# Patient Record
Sex: FEMALE | Race: WHITE | NOT HISPANIC OR LATINO
[De-identification: names, ages, dates, MRNs, and addresses within clinical notes are randomized per-mention and may not be internally consistent; named-entity substitution may affect disease eponyms.]

---

## 2017-05-31 ENCOUNTER — RECORDS - HEALTHEAST (OUTPATIENT)
Dept: ADMINISTRATIVE | Facility: OTHER | Age: 62
End: 2017-05-31

## 2017-05-31 ENCOUNTER — COMMUNICATION - HEALTHEAST (OUTPATIENT)
Dept: FAMILY MEDICINE | Facility: CLINIC | Age: 62
End: 2017-05-31

## 2017-08-22 ENCOUNTER — OFFICE VISIT - HEALTHEAST (OUTPATIENT)
Dept: FAMILY MEDICINE | Facility: CLINIC | Age: 62
End: 2017-08-22

## 2017-08-22 DIAGNOSIS — J30.9 ALLERGIC RHINITIS: ICD-10-CM

## 2017-08-22 DIAGNOSIS — K63.5 COLON POLYPS: ICD-10-CM

## 2018-10-23 ENCOUNTER — OFFICE VISIT - HEALTHEAST (OUTPATIENT)
Dept: FAMILY MEDICINE | Facility: CLINIC | Age: 63
End: 2018-10-23

## 2018-10-23 DIAGNOSIS — J30.1 SEASONAL ALLERGIC RHINITIS DUE TO POLLEN: ICD-10-CM

## 2018-10-23 DIAGNOSIS — Z12.31 VISIT FOR SCREENING MAMMOGRAM: ICD-10-CM

## 2018-11-09 ENCOUNTER — HOSPITAL ENCOUNTER (OUTPATIENT)
Dept: MAMMOGRAPHY | Facility: CLINIC | Age: 63
Discharge: HOME | End: 2018-11-09
Attending: NURSE PRACTITIONER

## 2018-11-09 DIAGNOSIS — Z12.31 VISIT FOR SCREENING MAMMOGRAM: ICD-10-CM

## 2019-11-05 ENCOUNTER — OFFICE VISIT - HEALTHEAST (OUTPATIENT)
Dept: FAMILY MEDICINE | Facility: CLINIC | Age: 64
End: 2019-11-05

## 2019-11-05 DIAGNOSIS — J30.1 SEASONAL ALLERGIC RHINITIS DUE TO POLLEN: ICD-10-CM

## 2019-11-05 DIAGNOSIS — Z13.9 SCREENING FOR CONDITION: ICD-10-CM

## 2019-11-05 DIAGNOSIS — R03.0 ELEVATED BLOOD PRESSURE READING WITHOUT DIAGNOSIS OF HYPERTENSION: ICD-10-CM

## 2019-11-05 DIAGNOSIS — M54.2 NECK PAIN: ICD-10-CM

## 2019-11-05 LAB
CHOLEST SERPL-MCNC: 261 MG/DL
FASTING STATUS PATIENT QL REPORTED: YES
FASTING STATUS PATIENT QL REPORTED: YES
GLUCOSE BLD-MCNC: 79 MG/DL (ref 70–99)
HDLC SERPL-MCNC: 78 MG/DL
LDLC SERPL CALC-MCNC: 172 MG/DL
TRIGL SERPL-MCNC: 54 MG/DL

## 2019-11-05 RX ORDER — DESLORATADINE 5 MG/1
5 TABLET ORAL DAILY
Qty: 90 TABLET | Refills: 3 | Status: SHIPPED | OUTPATIENT
Start: 2019-11-05

## 2019-11-05 ASSESSMENT — MIFFLIN-ST. JEOR: SCORE: 1255.51

## 2019-11-06 ENCOUNTER — COMMUNICATION - HEALTHEAST (OUTPATIENT)
Dept: FAMILY MEDICINE | Facility: CLINIC | Age: 64
End: 2019-11-06

## 2021-05-24 ENCOUNTER — RECORDS - HEALTHEAST (OUTPATIENT)
Dept: ADMINISTRATIVE | Facility: CLINIC | Age: 66
End: 2021-05-24

## 2021-05-25 ENCOUNTER — RECORDS - HEALTHEAST (OUTPATIENT)
Dept: ADMINISTRATIVE | Facility: CLINIC | Age: 66
End: 2021-05-25

## 2021-05-26 ENCOUNTER — RECORDS - HEALTHEAST (OUTPATIENT)
Dept: ADMINISTRATIVE | Facility: CLINIC | Age: 66
End: 2021-05-26

## 2021-05-27 ENCOUNTER — RECORDS - HEALTHEAST (OUTPATIENT)
Dept: ADMINISTRATIVE | Facility: CLINIC | Age: 66
End: 2021-05-27

## 2021-05-28 ENCOUNTER — RECORDS - HEALTHEAST (OUTPATIENT)
Dept: ADMINISTRATIVE | Facility: CLINIC | Age: 66
End: 2021-05-28

## 2021-05-31 VITALS — WEIGHT: 161.4 LBS | BODY MASS INDEX: 27.28 KG/M2

## 2021-06-01 ENCOUNTER — RECORDS - HEALTHEAST (OUTPATIENT)
Dept: ADMINISTRATIVE | Facility: CLINIC | Age: 66
End: 2021-06-01

## 2021-06-02 VITALS — WEIGHT: 158.25 LBS | BODY MASS INDEX: 26.74 KG/M2

## 2021-06-03 VITALS
BODY MASS INDEX: 26.73 KG/M2 | TEMPERATURE: 97 F | SYSTOLIC BLOOD PRESSURE: 152 MMHG | DIASTOLIC BLOOD PRESSURE: 77 MMHG | WEIGHT: 160.4 LBS | OXYGEN SATURATION: 100 % | HEIGHT: 65 IN | HEART RATE: 66 BPM

## 2021-06-03 NOTE — PATIENT INSTRUCTIONS - HE
Get a home blood pressure cuff and check a few times a week and let me know if >140/90 (in this case we should have you come in to start medication).    Please think about colonoscopy - you are overdue and at increased risk of colon cancer with your history of polyp.    Think about pneumonia shots and shingles vaccines - they are recommended. You can get them with a nurse only visit or at the drug store.

## 2021-06-03 NOTE — PROGRESS NOTES
ASSESSMENT & PLAN:  1.  allergic rhinitis   - desloratadine (CLARINEX) 5 mg tablet; Take 1 tablet (5 mg total) by mouth daily.  Dispense: 90 tablet; Refill: 3    2. Screening for condition  She is fasting today and willing to obtain screening labs, which have not been done for quite some time  - Lipid Cascade  - Glucose    3.  Elevated blood pressure without diagnosis of hypertension  Recommend she check her home blood pressure at least a few times a week and let me know if running greater than 140/90.  We discussed that medications would be recommended even if trying to work on lifestyle changes.    4.  History of adenomatous polyp of the colon  Strongly recommended colonoscopy.  Spent some time discussing this, and her risk of delayed diagnosis of colon cancer if she is not getting appropriately screened.  She could discuss with GI her significant difficulty with prep in the past.  After much discussion she still declines an order but states she will think about it.  Not a candidate for Cologuard due to previous polyp.    5.  Need for vaccination  Encouraged Prevnar, Pneumovax, and recommend she consider shingles vaccine.  Discussed safety and potential side effects.  She was given vaccine information sheets on these.    6.  Intermittent right neck pain  Normal exam today.  Suspect muscular versus an intermittently irritated lymph node over the last month.  Recommend she follow-up if this persists or certainly if she notices any lump or mass.    Patient Instructions   Get a home blood pressure cuff and check a few times a week and let me know if >140/90 (in this case we should have you come in to start medication).    Please think about colonoscopy - you are overdue and at increased risk of colon cancer with your history of polyp.    Think about pneumonia shots and shingles vaccines - they are recommended. You can get them with a nurse only visit or at the drug store.           Orders Placed This Encounter    Procedures     Lipid Cascade     Order Specific Question:   Fasting is required?     Answer:   Yes     Glucose     Order Specific Question:   Has the patient been fasting at least 8 hours?     Answer:   Yes     Medications Discontinued During This Encounter   Medication Reason     desloratadine (CLARINEX) 5 mg tablet Reorder       Return in about 1 year (around 11/5/2020) for Annual physical.    CHIEF COMPLAINT:  Chief Complaint   Patient presents with     Allergies     refills of clarinex     Pain     pain in right side of neck/jaw, feels like mayne allergy drainage, slight sore throat today       HISTORY OF PRESENT ILLNESS:  Dolly is a 64 y.o. female presenting to the clinic today for med check and refill of her Clarinex.  States she has tried multiple allergy medications in the past and gets most relief with Clarinex.  Has also tried nasal spray such as Flonase, but she feels this gave her frequent sinus infections.  Reports year-round allergies.    Over the last month, reports a discomfort on her neck behind the angle of her jaw off and on, mild, also corresponding possibly with an earache on the right.  Has not noticed any significant lump or swelling in the area.  No fevers or chills.    Blood pressure is elevated today.  She states blood pressure has been mildly elevated at other recent visits.  She states her  has a home blood pressure cuff she could use.  She would strongly prefer lifestyle changes to try to control this.    Patient has a history of colon polyps, last had colonoscopy in 2011.  The prep was unbearable for her with multiple episodes of vomiting.  She has been unwilling to repeat the test since.    Patient follows with Dr. Sparrow for Pap, annual GYN exam, mammogram, and DEXA scan.  She states these are up-to-date.    Patient has not had pneumonia vaccines.  She is extremely hesitant to any vaccines as she feels that she gets a 7 to 10-day reaction, feeling extremely rundown, every  "time she gets a vaccine.    REVIEW OF SYSTEMS:   All other systems are negative.    PFSH:  Patient Active Problem List   Diagnosis     Allergic Rhinitis     Lyme disease 2013 treated      Colon polyps 2011- repeat every 5 years         TOBACCO USE:  Social History     Tobacco Use   Smoking Status Never Smoker   Smokeless Tobacco Never Used       VITALS:  Vitals:    11/05/19 1412 11/05/19 1447   BP: 150/78 152/77   Patient Site: Left Arm    Patient Position: Sitting    Cuff Size: Adult Large    Pulse: 66    Temp: 97  F (36.1  C)    TempSrc: Oral    SpO2: 100%    Weight: 160 lb 6.4 oz (72.8 kg)    Height: 5' 4.5\" (1.638 m)      Wt Readings from Last 3 Encounters:   11/05/19 160 lb 6.4 oz (72.8 kg)   10/23/18 158 lb 4 oz (71.8 kg)   08/22/17 161 lb 6.4 oz (73.2 kg)     Body mass index is 27.11 kg/m .    PHYSICAL EXAM:  GENERAL: Pleasant, well-appearing patient in no acute distress.   HEENT: Pupils equal round reactive to light. Sclerae and conjunctivae clear. Oropharynx is clear with moist mucous membranes.  TMs clear bilaterally.  NECK: Supple without lymphadenopathy, no carotid bruits   CARDIOVASCULAR: Heart regular rate and rhythm without murmur normal S1-S2   LUNGS: Clear to auscultation bilaterally, good air movement throughout   EXTREMITIES Warm and well-perfused without edema. Pedal pulses palpable and symmetric bilaterally   NEURO: Alert and oriented. Grossly nonfocal.   PSYCHIATRIC: Presents on time and well groomed. Normal speech and thought content. Full affect. No abnormal movements or behaviors noted.        MEDICATIONS:  Current Outpatient Medications   Medication Sig Dispense Refill     aspirin 81 MG EC tablet Take 81 mg by mouth daily.       desloratadine (CLARINEX) 5 mg tablet Take 1 tablet (5 mg total) by mouth daily. 90 tablet 3     No current facility-administered medications for this visit.          "

## 2021-06-12 NOTE — PROGRESS NOTES
ASSESSMENT & PLAN:  Dolly was seen today for medication management.    Diagnoses and all orders for this visit:    Allergic rhinitis    Colon polyps 2011- repeat every 5 years     Other orders  -     desloratadine (CLARINEX) 5 mg tablet; Take 1 tablet (5 mg total) by mouth daily.      -pts allergies well-controlled currently on the desloratadine.  She is wanting a refill today.  Year supply called in.  Also addressed her health maintenance.  Recommended that she schedule mammogram and bone density scan when she is not anxious to do but would consider scheduling her mammogram in the future and I did give her a referral phone number to do so.  Her colonoscopy in 2011 showed polyps and recommended repeating every 5 years but she is reluctant to following up at that interval since she had a hard time with the prep and is not interested in a referral at this time.  We discussed that she cannot always have symptoms to know if there is any underlying concern.  She will consider in the future and we discussed that I would continue to recommend this every year.  -She has had treatment for Lyme's prophylaxis this year and is not having any symptoms.  -[facility] on her last Pap smear.  Follow-up annually  -We reviewed labs and she has not had lab work done since 2012.  She is declining labs today and will consider next time I see her.    Patient Instructions   Schedule your mammogram at [facility] by calling [phone].       No orders of the defined types were placed in this encounter.    Medications Discontinued During This Encounter   Medication Reason     desloratadine (CLARINEX) 5 mg tablet Reorder       No Follow-up on file.     CHIEF COMPLAINT:  Chief Complaint   Patient presents with     Medication Management     Med Refills        HISTORY OF PRESENT ILLNESS:  Dolly is a 62 y.o. female presenting to the clinic today to follow up on her allergies, weight, tick bite, and health  maintenance.     Allergies: She has allergies in the spring and URI's in the winter. Desloratadine 5 mg daily is still working well for her. She started having allergies when she was living in [place], she had spring allergies in [place], allergies all year round in [place], and now she has allergies all year round in [place]. She has tried going off the medication, but her doctors told her to stay on it because of her symptoms. Her allergies have gotten worse with age; she is allergic to mold and dust all year round. She develops a gravely throat. Windy days are rough for her. 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     Overweight: She is wondering why her diastolic blood pressure reading is high at 88 today. She gained some weight this winter because of illness and inactivity. She drank some caffeine this morning. She has been drinking more caffeine this year than usual; she is sensitive to caffeine in that she cannot drink it after noon or it will affect her sleep.     Hx Lyme's Disease: She had a tick attached to her in June 2017; she was not sure how long the tick was attached for, so she went to the [facility] and took a course of doxycycline. It was probably attached for 12-14 hours, and it was a large tick.     Health Maintenance: She has not had a repeat colonoscopy yet. She cannot do Cologuard because she has hemorrhoids and a history of polyps. She is declining a colonoscopy for now; she would like to wait a while longer. She had diarrhea and vomiting from the prep; she could not even finish the prep, but everything else went well. She was seen by Dr. Sparrow for a Pap smear, but she thinks that was done in 2016. She is wondering if she should have a Life [facility] screening; many of her friends are doing it, and it might cost around $140. She is taking aspirin 81 mg daily. She is willing to have blood work done next year.     REVIEW OF SYSTEMS:   Complete review of  systems reviewed in the HPI or otherwise negative.     PFSH:    TOBACCO USE:  History   Smoking Status     Never Smoker   Smokeless Tobacco     Never Used        VITALS:  Vitals:    08/22/17 1127 08/22/17 1153   BP: 110/88 110/80   Patient Site: Right Arm Right Arm   Patient Position: Sitting Sitting   Cuff Size: Adult Regular Adult Regular   Pulse: 68    Resp: 16    Temp: 98.1  F (36.7  C)    TempSrc: Oral    Weight: 161 lb 6.4 oz (73.2 kg)      Wt Readings from Last 3 Encounters:   08/22/17 161 lb 6.4 oz (73.2 kg)   09/16/16 159 lb (72.1 kg)   09/24/15 150 lb 12 oz (68.4 kg)     Body mass index is 27.28 kg/(m^2).  No LMP recorded. Patient is postmenopausal.     PHYSICAL EXAM:  GENERAL:  Reveals an alert 62 y.o. female in NAD.  Vitals:  Per nursing notes.  EYES: PERRLA. Extraocular movements intact. Normal conjunctiva and lids.   ENT:  Hearing grossly normal.  Normal appearance to ears and nose.  Bilateral TM s, external canals, oropharynx normal. Normal lips, gums and teeth.  Normal nasal mucosa, septum and turbinates.  NECK:  Supple, thyroid not palpable.  CARDIAC:  Regular rate and rhythm without murmurs, rubs, or gallops. Legs with trace edema bilateral. Carotids without bruits.   LUNGS: Clear.  Respiratory effort normal.  ABDOMEN:  Soft, non-tender, without hepatosplenomegaly, masses, or hernias.   SKIN:   Without rash, bruise, or palpable lesions.  NEURO:  Cranial nerves II-XII intact.     PSYCH:  Mood appropriate, memory intact.      QUALITY MEASURES:  The following high BMI interventions were performed this visit: encouragement to exercise, dietary needs education and lifestyle education regarding diet    DATA REVIEWED:  ADDITIONAL HISTORY SUMMARIZED (2): None.  DECISION TO OBTAIN EXTRA INFORMATION (1): None.   RADIOLOGY TESTS (1): Reviewed last DXA and mammogram reports 2015.  LABS (1): Reviewed last labs 2012.  MEDICINE TESTS (1): None.  INDEPENDENT REVIEW (2 each): None.     The visit lasted a total of  20 minutes face to face with the patient. Over 50% of the time was spent counseling and educating the patient about her chronic health conditions, medications, and health maintenance.     I, Denise Ayala, am scribing for and in the presence of Dr. Dodson.  I, Anisha Dodson DO , personally performed the services described in this documentation, as scribed by Denise Ayala in my presence, and it is both accurate and complete.    This note has been dictated using voice recognition software. Any grammatical or context distortions are unintentional and inherent to the software.     MEDICATIONS:  Current Outpatient Prescriptions   Medication Sig Dispense Refill     aspirin 81 MG EC tablet Take 81 mg by mouth daily.       desloratadine (CLARINEX) 5 mg tablet Take 1 tablet (5 mg total) by mouth daily. 90 tablet 3     No current facility-administered medications for this visit.         Total data points: 2

## 2021-06-19 NOTE — LETTER
Letter by Aileen Paiz MD at      Author: Aileen Paiz MD Service: -- Author Type: --    Filed:  Encounter Date: 11/6/2019 Status: Signed         Dolly SANDOVAL Grullon  [address]   [place] [place] [address]             November 6, 2019         Dear MsPeggy Grullon,    Below are the results from your recent visit:    Resulted Orders   Lipid Cascade   Result Value Ref Range    Cholesterol 261 (H) <=199 mg/dL    Triglycerides 54 <=149 mg/dL    HDL Cholesterol 78 >=50 mg/dL    LDL Calculated 172 (H) <=129 mg/dL    Patient Fasting > 8hrs? Yes    Glucose   Result Value Ref Range    Glucose 79 70 - 99 mg/dL    Patient Fasting > 8hrs? Yes     Narrative    Fasting Glucose reference range is 70-99 mg/dL per  American Diabetes Association (ADA) guidelines.     Your cholesterol is high, and high enough that medication would typically be recommended (atorvastatin, which is generic lipitor) in order to reduce risk of heart attack and stroke. Please let me know if you are willing to start it. If not, I would recommend increased exercise and weight loss, which can bring the cholesterol down. I would recommend rechecking cholesterol in about 4 months to see if improving.     Blood sugar is normal.    Please call with questions or contact us using [employer].    Sincerely,        Electronically signed by Aileen Paiz MD

## 2021-06-21 NOTE — PROGRESS NOTES
ASSESSMENT:  1. Visit for screening mammogram  Mammo Screening Bilateral   2. Seasonal allergic rhinitis due to pollen  desloratadine (CLARINEX) 5 mg tablet       PLAN:  Mammogram order placed today  Refill provided for clarinex. Follow up for annual exam when needed.   No problem-specific Assessment & Plan notes found for this encounter.      There are no Patient Instructions on file for this visit.    Orders Placed This Encounter   Procedures     Mammo Screening Bilateral     Standing Status:   Future     Standing Expiration Date:   1/23/2020     Order Specific Question:   Patient's previous breast density:     Answer:   Scattered fibroglandular density [2]     Order Specific Question:   Can the procedure be changed per Radiologist protocol?     Answer:   Yes     Medications Discontinued During This Encounter   Medication Reason     desloratadine (CLARINEX) 5 mg tablet Reorder       No Follow-up on file.    CHIEF COMPLAINT:  Chief Complaint   Patient presents with     Medication Management     Medication Refill       HISTORY OF PRESENT ILLNESS:  Dolly is a 63 y.o. female here today for medication check. She is due for colonoscopy and will think about scheduling. She will let us place order for mammogram today. Main reason for visit is refill of Clarinex which she uses for her allergic rhinitis. This works well for her currently to control symptoms. No side effects. She has tried a number of things throughout the years and this is working well for several years now. Main symptoms are congestion, itching.      REVIEW OF SYSTEMS:      Pertinent items are noted in HPI.  All other systems are negative  PFSH:  Reviewed, no changes      TOBACCO USE:  History   Smoking Status     Never Smoker   Smokeless Tobacco     Never Used       VITALS:  Vitals:    10/23/18 1142 10/23/18 1224   BP: 148/84 132/84   Patient Site: Left Arm Left Arm   Patient Position: Sitting Sitting   Cuff Size: Adult Regular Adult Regular   Pulse:  70 82   Weight: 158 lb 4 oz (71.8 kg)      Wt Readings from Last 3 Encounters:   10/23/18 158 lb 4 oz (71.8 kg)   08/22/17 161 lb 6.4 oz (73.2 kg)   09/16/16 159 lb (72.1 kg)       PHYSICAL EXAM:   /84 (Patient Site: Left Arm, Patient Position: Sitting, Cuff Size: Adult Regular)  Pulse 82  Wt 158 lb 4 oz (71.8 kg)  BMI 26.74 kg/m2  General appearance: alert, appears stated age and cooperative  Head: Normocephalic, without obvious abnormality, atraumatic  Eyes: conjunctivae/corneas clear. PERRL, EOM's intact. Fundi benign.  Ears: normal TM's and external ear canals both ears  Nose: Nares normal. Septum midline. Mucosa normal. No drainage or sinus tenderness.  Throat: lips, mucosa, and tongue normal; teeth and gums normal  Lungs: clear to auscultation bilaterally  Heart: regular rate and rhythm, S1, S2 normal, no murmur, click, rub or gallop  Neurologic: Grossly normal  Psychologic: Mood and affect normal.      DATA REVIEWED:  Additional History from Old Records Summarized (2): 0  Decision to Obtain Records (1): 0  Radiology Tests Summarized or Ordered (1): 0  Labs Reviewed or Ordered (1): 0  Medicine Test Summarized or Ordered (1): 0  Independent Review of EKG or X-RAY(2 each): 0    The visit lasted a total of 20 minutes face to face with the patient. Over 50% of the time was spent counseling and educating the patient about plan of care.    MEDICATIONS:  Current Outpatient Prescriptions   Medication Sig Dispense Refill     aspirin 81 MG EC tablet Take 81 mg by mouth daily.       desloratadine (CLARINEX) 5 mg tablet Take 1 tablet (5 mg total) by mouth daily. 90 tablet 3     No current facility-administered medications for this visit.        This note has been dictated using voice recognition software. Any grammatical or context distortions are unintentional and inherent to the software

## 2021-07-21 ENCOUNTER — RECORDS - HEALTHEAST (OUTPATIENT)
Dept: ADMINISTRATIVE | Facility: CLINIC | Age: 66
End: 2021-07-21